# Patient Record
Sex: FEMALE | Race: AMERICAN INDIAN OR ALASKA NATIVE
[De-identification: names, ages, dates, MRNs, and addresses within clinical notes are randomized per-mention and may not be internally consistent; named-entity substitution may affect disease eponyms.]

---

## 2019-08-18 ENCOUNTER — HOSPITAL ENCOUNTER (EMERGENCY)
Dept: HOSPITAL 5 - ED | Age: 39
Discharge: HOME | End: 2019-08-18
Payer: MEDICARE

## 2019-08-18 VITALS — DIASTOLIC BLOOD PRESSURE: 100 MMHG | SYSTOLIC BLOOD PRESSURE: 170 MMHG

## 2019-08-18 DIAGNOSIS — Z87.442: ICD-10-CM

## 2019-08-18 DIAGNOSIS — I10: ICD-10-CM

## 2019-08-18 DIAGNOSIS — Z79.899: ICD-10-CM

## 2019-08-18 DIAGNOSIS — F32.9: ICD-10-CM

## 2019-08-18 DIAGNOSIS — K08.89: Primary | ICD-10-CM

## 2019-08-18 DIAGNOSIS — Z98.51: ICD-10-CM

## 2019-08-18 PROCEDURE — 99283 EMERGENCY DEPT VISIT LOW MDM: CPT

## 2019-08-18 PROCEDURE — 82803 BLOOD GASES ANY COMBINATION: CPT

## 2019-08-18 NOTE — EMERGENCY DEPARTMENT REPORT
HPI





- General


Chief Complaint: Dental/Oral


Time Seen by Provider: 08/18/19 01:21





- HPI


HPI: 





39-year-old AA female presents to the emergency department with complaint of 

some pain to the right upper and lower jaw with multiple toothaches going on 

since last night.  She has a known history of some dental infections but she 

says that "it has been a while."  Denies any drooling or trismus.  No fever.  

She has a past medical history of hypertension, kidney stones, sarcoidosis, 

depression.  She took some extra strength Tylenol once or twice for her symptoms

without any relief.





ED Past Medical Hx





- Past Medical History


Previous Medical History?: Yes


Hx Hypertension: Yes


Hx Kidney Stones: Yes


Hx Psychiatric Treatment: Yes (depression)


Additional medical history: sarcodosis  glaucoma





- Surgical History


Past Surgical History?: Yes


Additional Surgical History: lithotrpsy  tubal ligation x 2  cataracts bilat





- Social History


Smoking Status: Never Smoker


Substance Use Type: None





- Medications


Home Medications: 


                                Home Medications











 Medication  Instructions  Recorded  Confirmed  Last Taken  Type


 


Citalopram Hydrobromide [Celexa] 40 mg PO DAILY 10/30/13 10/30/13 10/30/13 10:00

 History


 


Dorzolamide HCl/Timolol Maleat 1 drop OP BID 10/30/13 10/30/13 10/30/13 10:00 

History





[Dorzolamide-Timolol 22.3/6.8     





mg/ml]     


 


Naproxen Sodium (Nf) [Anaprox DS] 550 mg PO BID PRN #14 tablet 10/30/13  Unknown

Rx


 


buPROPion SR [Wellbutrin Sr] 150 mg PO BID 10/30/13 10/30/13 10/30/13 10:00 

History


 


methOCARBAMOL [Robaxin] 750 mg PO BID #14 tab 10/30/13  Unknown Rx


 


HYDROcodone/APAP 5-325 [Ralls 1 each PO Q6HR PRN #10 tablet 08/18/19  Unknown Rx





5/325]     


 


Penicillin Vk [Veetids TAB] 250 mg PO QID #28 tablet 08/18/19  Unknown Rx














ED Review of Systems


ROS: 


Stated complaint: TOOTHACHE


Other details as noted in HPI





Comment: All other systems reviewed and negative


Constitutional: denies: chills, fever


ENT: dental pain.  denies: ear pain, throat pain


Skin: denies: rash, lesions


Neurological: denies: headache, numbness





Physical Exam





- Physical Exam


Vital Signs: 


                                   Vital Signs











  08/18/19





  00:08


 


Temperature 99.4 F


 


Pulse Rate 89


 


Respiratory 18





Rate 


 


Blood Pressure 170/100


 


O2 Sat by Pulse 98





Oximetry 











Physical Exam: 





GENERAL: The patient is well-developed well-nourished.


HENT: Normocephalic.  Atraumatic.    Patient has moist mucous membranes.  No 

drooling or trismus.  Patient has multiple missing teeth and dental caries.  She

has tenderness to palpation to the upper and lower gumline/jaw about the levels 

of the lateral incisors and premolars.  No obvious or visible dental abscess.


EYES: Extraocular motions are intact.  


NECK: Supple.  Trachea is midline. 


ABDOMEN: There is no abdominal distention.


SKIN: Skin is warm and dry.


NEURO: The patient is awake, alert, and oriented.  The patient is cooperative.  

The patient has normal speech.


MUSCULOSKELETAL: There is no tenderness or deformity.   There is no evidence of 

acute injury.





ED Course


                                   Vital Signs











  08/18/19





  00:08


 


Temperature 99.4 F


 


Pulse Rate 89


 


Respiratory 18





Rate 


 


Blood Pressure 170/100


 


O2 Sat by Pulse 98





Oximetry 














ED Medical Decision Making





- Medical Decision Making


Patient has an acute dental pain.  No visible or palpable dental abscess.  No 

drooling or trismus.  Vital signs stable including being afebrile.  Patient does

have some hypertension but does have a history of hypertension and is currently 

uncomfortable.  She has been given a dose of penicillin VK here and some 

ibuprofen.  The patient will be discharged home with antibiotics, pain 

medication and a referral for a dental clinic.  She will return to ER with any 

worsening of her symptoms or any acute distress.








- Differential Diagnosis


toothache, dental caries, dental abscess


Critical Care Time: No


Critical care attestation.: 


If time is entered above; I have spent that time in minutes in the direct care 

of this critically ill patient, excluding procedure time.








ED Disposition


Clinical Impression: 


 Dentalgia





Hypertension


Qualifiers:


 Hypertension type: essential hypertension Qualified Code(s): I10 - Essential 

(primary) hypertension





Disposition: DC-01 TO HOME OR SELFCARE


Is pt being admited?: No


Condition: Stable


Instructions:  Dental Caries (ED), Hypertension (ED), Toothache (ED)


Additional Instructions: 


Please follow up with a dentist in the next few days.  Return to the emergency 

Department with any worsening of your symptoms or any acute distress.  Take the 

antibiotics as prescribed.  Return to the emergency Department with any 

worsening of your symptoms or any acute distress.





Take your blood pressure medications.  Try and stay away from foods that are 

high in salt and caffeinated products.  Keep a blood pressure log.





You have been prescribed a medication that is sedating and therefore should not 

be taken prior to driving, working, and responsible for children and in no way 

should be mixed with alcohol of any quantity.


Prescriptions: 


HYDROcodone/APAP 5-325 [Ralls 5/325] 1 each PO Q6HR PRN #10 tablet


 PRN Reason: Pain


Penicillin Vk [Veetids TAB] 250 mg PO QID #28 tablet


Referrals: 


[facility] [Outside] - 2-3 Days


Time of Disposition: 01:33

## 2022-03-26 ENCOUNTER — HOSPITAL ENCOUNTER (EMERGENCY)
Dept: HOSPITAL 5 - ED | Age: 42
LOS: 1 days | Discharge: LEFT BEFORE DISCHARGE | End: 2022-03-27
Payer: MEDICARE

## 2022-03-26 VITALS — DIASTOLIC BLOOD PRESSURE: 100 MMHG | SYSTOLIC BLOOD PRESSURE: 164 MMHG

## 2022-03-26 DIAGNOSIS — T16.9XXA: Primary | ICD-10-CM

## 2022-03-26 DIAGNOSIS — Z53.21: ICD-10-CM
